# Patient Record
Sex: FEMALE | Race: OTHER | HISPANIC OR LATINO | URBAN - METROPOLITAN AREA
[De-identification: names, ages, dates, MRNs, and addresses within clinical notes are randomized per-mention and may not be internally consistent; named-entity substitution may affect disease eponyms.]

---
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
## 2021-07-27 ENCOUNTER — RESULT REVIEW (OUTPATIENT)
Age: 21
End: 2021-07-27

## 2023-01-02 ENCOUNTER — EMERGENCY (EMERGENCY)
Facility: HOSPITAL | Age: 23
LOS: 1 days | Discharge: HOME | End: 2023-01-02
Attending: EMERGENCY MEDICINE | Admitting: EMERGENCY MEDICINE
Payer: COMMERCIAL

## 2023-01-02 VITALS
RESPIRATION RATE: 16 BRPM | DIASTOLIC BLOOD PRESSURE: 68 MMHG | OXYGEN SATURATION: 100 % | HEART RATE: 112 BPM | SYSTOLIC BLOOD PRESSURE: 122 MMHG | TEMPERATURE: 98 F

## 2023-01-02 PROCEDURE — 99284 EMERGENCY DEPT VISIT MOD MDM: CPT

## 2023-01-02 NOTE — ED ADULT NURSE NOTE - OBJECTIVE STATEMENT
pt aox4, ambulatory comes in for persistent right sided frontal neck pain for months with no relief. no exudate, no redness. breathing even and unlabored.

## 2023-01-02 NOTE — ED ADULT NURSE NOTE - CHIEF COMPLAINT QUOTE
Pt. present to [facility] ED with right sided neck pain. Pt. has had this symptom since the end of October. Was seen at [facility] in November. Cause might have been anxiety. Denies any pain at present. PMH: denies. NAD noted.

## 2023-01-02 NOTE — ED ADULT TRIAGE NOTE - CHIEF COMPLAINT QUOTE
Pt. present to [facility] ED with right sided neck pain. Pt. has had this symptom since the end of October. Was seen at [facility] in November. Cause might have been anxiety. Denies any pain at present. PMH: denies. NAD noted.

## 2023-01-03 VITALS — HEART RATE: 95 BPM

## 2023-01-03 NOTE — ED PROVIDER NOTE - PATIENT PORTAL LINK FT
You can access the FollowMyHealth Patient Portal offered by [facility] by registering at the following website: [email or web address]. By joining [employer]’s FollowMyHealth portal, you will also be able to view your health information using other applications (apps) compatible with our system.

## 2023-01-03 NOTE — ED PROVIDER NOTE - NS ED ROS FT
Constitutional: No weakness or fatigue, no fevers or chills  Skin: No rash or pruritis  HEENT: No sore throat. +Ear congestion  Cardiovascular: No chest pain, no shortness of breath  Respiratory: No shortness of breath, no cough  Gastrointestinal: No abdominal pain, no nausea, no vomiting, no diarrhea, no constipation  Musculoskeletal: No joint pain  Genitourinary: No dysuria or hematuria  Neurological: No focal weakness or tingling

## 2023-01-03 NOTE — ED PROVIDER NOTE - CLINICAL SUMMARY MEDICAL DECISION MAKING FREE TEXT BOX
23-year-old female with no medical history, presenting to ER with about 2 months of atraumatic bilateral anterior neck pain, worse on the right, with radiation to bilateral ears with ear congestion sensation, also worse on the right ear    Exam with right cerumen impaction, no lymphadenopathy, normal range of motion of neck, no tenderness to musculature    Assessment/plan  Symptoms appear to be related to right cerumen impaction.  Will refer to ENT for removal  pregnancy test was positive in ER today--Likely residual downtrending positive from recent elective .  Patient without any abdominal pain or vaginal bleeding or dizziness at this time.  Told patient to follow-up with her OB/GYN

## 2023-01-03 NOTE — ED PROVIDER NOTE - NSFOLLOWUPINSTRUCTIONS_ED_ALL_ED_FT
You were seen in the ER today for about 2 months of neck pain and ear congestion, worse on the right side.    Ear exam showed a large buildup of earwax in your right ear canal.  This may be causing her symptoms.    Please follow-up with an ENT specialist for earwax removal and avoid putting any Q-tips or other objects in your ear as this may worsen the buildup.  The hospital will call you within 48 hours to assist you in making an appointment.    Please return to the ER for any new or worsening symptoms

## 2023-01-03 NOTE — ED PROVIDER NOTE - OBJECTIVE STATEMENT
23-year-old female with no medical history, presenting to ER with about 2 months of atraumatic bilateral anterior neck pain, worse on the right, with radiation to bilateral ears with ear congestion sensation, also worse on the right ear.  Patient says she went to [facility] ER for this in November when symptoms started, was told that she might have anxiety causing the symptoms, as well as a right here infection, was given otic eardrops, but said she had difficulty getting up from her pharmacy so she never used them.  Patient says pain is worse with neck movement as well as swallowing and chewing.  No difficulty tolerating liquid or solid food, voice change, or fevers    Of note, pregnancy test was positive in ER today.  Patient said she had an  at clinic mid December (about 2 weeks ago), gestational age about 6 weeks, had a pelvic ultrasound prior to taking medication, ultrasound was normal, no longer having vaginal bleeding.

## 2023-01-03 NOTE — ED PROVIDER NOTE - PHYSICAL EXAMINATION
General: Patient alert in no apparent distress  Skin: Dry and intact  HEENT: Head atraumatic. Oral mucosa moist. No pharyngeal exudates, redness, or tonsillar enlargement, uvula midline without signs of peritonsillar abscess. No cervical lymphadenopathy. ++++cerumen impation to R ear, unable to see R TM. L TM wnl.  Eyes: Conjunctiva normal  Cardiac: Regular rhythm and rate. No pretibial edema b/l  Respiratory: Lungs clear b/l and symmetric. No respiratory distress. Able to speak in complete sentences.  Gastrointestinal: Abdomen soft, nondistended, nontender  Musculoskeletal: Moves all extremities spontaneously. no ttp to neck muscles. NROM neck  Neurological: alert and oriented to person, place, and time  Psychiatric: Calm and cooperative